# Patient Record
Sex: MALE | Race: WHITE | NOT HISPANIC OR LATINO | URBAN - METROPOLITAN AREA
[De-identification: names, ages, dates, MRNs, and addresses within clinical notes are randomized per-mention and may not be internally consistent; named-entity substitution may affect disease eponyms.]

---

## 2019-01-15 ENCOUNTER — EMERGENCY (EMERGENCY)
Facility: HOSPITAL | Age: 44
LOS: 1 days | Discharge: HOME | End: 2019-01-15
Admitting: EMERGENCY MEDICINE
Payer: COMMERCIAL

## 2019-01-15 VITALS
OXYGEN SATURATION: 98 % | SYSTOLIC BLOOD PRESSURE: 128 MMHG | RESPIRATION RATE: 16 BRPM | TEMPERATURE: 97 F | HEART RATE: 74 BPM | DIASTOLIC BLOOD PRESSURE: 87 MMHG | WEIGHT: 214.95 LBS

## 2019-01-15 DIAGNOSIS — R21 RASH AND OTHER NONSPECIFIC SKIN ERUPTION: ICD-10-CM

## 2019-01-15 DIAGNOSIS — B02.9 ZOSTER WITHOUT COMPLICATIONS: ICD-10-CM

## 2019-01-15 PROCEDURE — 99283 EMERGENCY DEPT VISIT LOW MDM: CPT

## 2019-01-15 PROCEDURE — 99284 EMERGENCY DEPT VISIT MOD MDM: CPT

## 2019-01-15 RX ORDER — IBUPROFEN 200 MG
1 TABLET ORAL
Qty: 21 | Refills: 0 | OUTPATIENT
Start: 2019-01-15 | End: 2019-01-21

## 2019-01-15 RX ORDER — VALACYCLOVIR 500 MG/1
1 TABLET, FILM COATED ORAL
Qty: 7 | Refills: 0 | OUTPATIENT
Start: 2019-01-15 | End: 2019-01-21

## 2019-01-15 RX ORDER — VALACYCLOVIR 500 MG/1
1000 TABLET, FILM COATED ORAL ONCE
Qty: 0 | Refills: 0 | Status: COMPLETED | OUTPATIENT
Start: 2019-01-15 | End: 2019-01-15

## 2019-01-15 RX ORDER — IBUPROFEN 200 MG
600 TABLET ORAL ONCE
Qty: 0 | Refills: 0 | Status: COMPLETED | OUTPATIENT
Start: 2019-01-15 | End: 2019-01-15

## 2019-01-15 RX ORDER — OXYCODONE AND ACETAMINOPHEN 5; 325 MG/1; MG/1
1 TABLET ORAL ONCE
Qty: 0 | Refills: 0 | Status: DISCONTINUED | OUTPATIENT
Start: 2019-01-15 | End: 2019-01-15

## 2019-01-15 RX ADMIN — Medication 600 MILLIGRAM(S): at 17:21

## 2019-01-15 RX ADMIN — OXYCODONE AND ACETAMINOPHEN 1 TABLET(S): 5; 325 TABLET ORAL at 17:21

## 2019-01-15 RX ADMIN — VALACYCLOVIR 1000 MILLIGRAM(S): 500 TABLET, FILM COATED ORAL at 17:21

## 2019-01-15 NOTE — ED ADULT NURSE NOTE - CHPI ED NUR SYMPTOMS NEG
no fever/no chills/no confusion/no itching/no body aches/no scaly patches on skin/no vomiting/no decreased eating/drinking

## 2019-01-15 NOTE — ED PROVIDER NOTE - OBJECTIVE STATEMENT
43 y o male denying PMHX presents to ED for rash in the anterior and posterior right trunk for x3-4 days. Notes painful vessicles. Denies fevers, chills, N/V, abdominal pain, SOB, chest pain, or other sx.

## 2019-01-15 NOTE — ED PROVIDER NOTE - MEDICAL DECISION MAKING DETAILS
Pt presents with painful rash for 3-4 days. Grouped dry vesicles on Right sided trunk. Suspected shingles. Will dc with prescribed abx. No other complaints at this time. VS normal.

## 2019-01-15 NOTE — ED ADULT NURSE NOTE - NSIMPLEMENTINTERV_GEN_ALL_ED
Implemented All Universal Safety Interventions:  [place] to call system. Call bell, personal items and telephone within reach. Instruct patient to call for assistance. Room bathroom lighting operational. Non-slip footwear when patient is off stretcher. Physically safe environment: no spills, clutter or unnecessary equipment. Stretcher in lowest position, wheels locked, appropriate side rails in place.

## 2019-01-15 NOTE — ED ADULT TRIAGE NOTE - CHIEF COMPLAINT QUOTE
Pt presents to ED with c/o rash with sharp pain to right scapula, denies itching/drainage or recent travel.

## 2019-03-01 ENCOUNTER — APPOINTMENT (EMERGENCY)
Dept: RADIOLOGY | Facility: HOSPITAL | Age: 44
End: 2019-03-01
Payer: MEDICAID

## 2019-03-01 ENCOUNTER — HOSPITAL ENCOUNTER (EMERGENCY)
Facility: HOSPITAL | Age: 44
Discharge: HOME | End: 2019-03-01
Attending: EMERGENCY MEDICINE
Payer: MEDICAID

## 2019-03-01 VITALS
SYSTOLIC BLOOD PRESSURE: 153 MMHG | HEART RATE: 82 BPM | WEIGHT: 215 LBS | BODY MASS INDEX: 29.12 KG/M2 | DIASTOLIC BLOOD PRESSURE: 93 MMHG | TEMPERATURE: 98 F | OXYGEN SATURATION: 96 % | RESPIRATION RATE: 20 BRPM | HEIGHT: 72 IN

## 2019-03-01 DIAGNOSIS — J06.9 INFECTION OF THE UPPER RESPIRATORY TRACT: Primary | ICD-10-CM

## 2019-03-01 PROCEDURE — 71046 X-RAY EXAM CHEST 2 VIEWS: CPT

## 2019-03-01 PROCEDURE — 99283 EMERGENCY DEPT VISIT LOW MDM: CPT

## 2019-03-01 RX ORDER — AZITHROMYCIN 250 MG/1
500 TABLET, FILM COATED ORAL ONCE
Status: COMPLETED | OUTPATIENT
Start: 2019-03-01 | End: 2019-03-01

## 2019-03-01 RX ORDER — AZITHROMYCIN 250 MG/1
TABLET, FILM COATED ORAL
Qty: 6 TABLET | Refills: 0 | Status: SHIPPED | OUTPATIENT
Start: 2019-03-01 | End: 2019-03-05

## 2019-03-01 RX ADMIN — AZITHROMYCIN 500 MG: 250 TABLET, FILM COATED ORAL at 23:27

## 2019-03-02 NOTE — ED PROVIDER NOTES
History  Chief Complaint   Patient presents with    Cough     patient is c/o a cough x 2 days  denies fever  Patient is a 44-year-old male presents emergency department with complaints of cough and congestion for last 2 days  Patient denies fever, chills, chest pain, shortness of breath  Patient denies nausea or vomiting  None       History reviewed  No pertinent past medical history  History reviewed  No pertinent surgical history  History reviewed  No pertinent family history  I have reviewed and agree with the history as documented  Social History     Tobacco Use    Smoking status: Never Smoker    Smokeless tobacco: Never Used   Substance Use Topics    Alcohol use: Not Currently    Drug use: Not Currently        Review of Systems   Constitutional: Negative for fever  Respiratory: Positive for cough  Negative for shortness of breath  Cardiovascular: Negative for chest pain  All other systems reviewed and are negative  Physical Exam  Physical Exam   Constitutional: He is oriented to person, place, and time  He appears well-developed and well-nourished  HENT:   Head: Normocephalic and atraumatic  Right Ear: External ear normal    Left Ear: External ear normal    Nose: Nose normal    Mouth/Throat: Oropharynx is clear and moist    Eyes: Pupils are equal, round, and reactive to light  Conjunctivae and EOM are normal    Neck: Normal range of motion  Cardiovascular: Normal rate and regular rhythm  Pulmonary/Chest: Effort normal and breath sounds normal    Abdominal: Soft  Bowel sounds are normal    Musculoskeletal: Normal range of motion  Neurological: He is alert and oriented to person, place, and time  Skin: Skin is warm  Psychiatric: He has a normal mood and affect  His behavior is normal  Judgment and thought content normal    Vitals reviewed        Vital Signs  ED Triage Vitals   Temperature Pulse Respirations Blood Pressure SpO2   03/01/19 2236 03/01/19 2236 03/01/19 2236 03/01/19 2236 03/01/19 2236   97 8 °F (36 6 °C) 82 20 153/93 96 %      Temp Source Heart Rate Source Patient Position - Orthostatic VS BP Location FiO2 (%)   03/01/19 2236 03/01/19 2236 03/01/19 2236 03/01/19 2236 --   Oral Monitor Sitting Left arm       Pain Score       03/01/19 2248       No Pain           Vitals:    03/01/19 2236   BP: 153/93   Pulse: 82   Patient Position - Orthostatic VS: Sitting       Visual Acuity      ED Medications  Medications   azithromycin (ZITHROMAX) tablet 500 mg (500 mg Oral Given 3/1/19 2327)       Diagnostic Studies  Results Reviewed     None                 XR chest pa & lateral   ED Interpretation by Nae Jasso PA-C (03/01 2325)   No acute disease                 Procedures  Procedures       Phone Contacts  ED Phone Contact    ED Course                               MDM  Number of Diagnoses or Management Options  Infection of the upper respiratory tract:   Diagnosis management comments: Patient is a 51-year-old male presents emergency department complaints of cough and congestion  X-ray images reviewed does not demonstrate any evidence of pneumonia  Patient started on azithromycin  His continue this for and 5 days  Follow up with his family doctor  Patient stable for discharge  Return parameters were discussed         Amount and/or Complexity of Data Reviewed  Tests in the radiology section of CPT®: ordered and reviewed  Independent visualization of images, tracings, or specimens: yes    Risk of Complications, Morbidity, and/or Mortality  Presenting problems: moderate  Diagnostic procedures: moderate  Management options: moderate    Patient Progress  Patient progress: stable      Disposition  Final diagnoses:   Infection of the upper respiratory tract     Time reflects when diagnosis was documented in both MDM as applicable and the Disposition within this note     Time User Action Codes Description Comment    3/1/2019 11:23 PM Berenice Pappas [J06 9] Infection of the upper respiratory tract       ED Disposition     ED Disposition Condition Date/Time Comment    Discharge Good Fri Mar 1, 2019 11:23  [facility] discharge to home/self care  Follow-up Information    None         Discharge Medication List as of 3/1/2019 11:24 PM      START taking these medications    Details   azithromycin (ZITHROMAX) 250 mg tablet Take 2 tablets today then 1 tablet daily x 4 days, Print           No discharge procedures on file      ED Provider  Electronically Signed by           Que Powers PA-C  03/02/19 6107

## 2021-01-19 ENCOUNTER — EMERGENCY (EMERGENCY)
Facility: HOSPITAL | Age: 46
LOS: 1 days | Discharge: HOME | End: 2021-01-19
Attending: EMERGENCY MEDICINE | Admitting: EMERGENCY MEDICINE
Payer: COMMERCIAL

## 2021-01-19 VITALS
SYSTOLIC BLOOD PRESSURE: 131 MMHG | DIASTOLIC BLOOD PRESSURE: 85 MMHG | HEART RATE: 80 BPM | TEMPERATURE: 98 F | OXYGEN SATURATION: 99 % | RESPIRATION RATE: 18 BRPM

## 2021-01-19 DIAGNOSIS — Z20.822 CONTACT WITH AND (SUSPECTED) EXPOSURE TO COVID-19: ICD-10-CM

## 2021-01-19 PROCEDURE — 99283 EMERGENCY DEPT VISIT LOW MDM: CPT

## 2021-01-19 NOTE — ED PROVIDER NOTE - NSFOLLOWUPINSTRUCTIONS_ED_ALL_ED_FT
Your test results may take 1-3 days. You will get a text/email.  Please check the patient online portal (Shaz[employer] and website) for results. You can create a portal account at [email or web address]. [facility] [place]. If you have old records with [employer] or [facility]  or encounter any difficulties with us you will need to call the HELP line to merge results [phone] (Mon-Fri 8a-5p).    Please follow the instructions on provided coronavirus discharge educational forms and if needed self quarantine for 14 days.     If you test positive for COVID 19:    1. STAY HOME for 14 DAYS  2. Minimize human contact to ONLY ESSENTIAL  3. Every time you wash your hands, sing the HAPPY BIRTHDAY song so you know you're washing long enough.  Make sure to scrub the webspace between your fingers.  4. DRINK 1-3 Liters of fluids day x at least 5 days.  To remain hydrated. Your fatigue, lightheadedness, and body aches will decrease and your fever has a better chance of breaking if you are well hydrated.    5. For your Fever and Body aches takes Tylenol 650-100mg every 4-6h (max 4000mg/day). Try not to use ibuprofen, aspirin or naproxen (Advil, Motrin or Aleve) as these may worsen Coronavirus infection.  6. Use an inhaler for mild shortness of breath and cough  7. RETURN TO THE ER IMMEDIATELY IF YOU HAVE WORSENING SHORTNESS OF BREATH  8. TAKE THE FOLLOWING SUPPLEMENTS DAILY.        VITAMIN C 1000MG ONCE DAILY.        VITAMIN D 200IU ONCE DAILY.        ZINC 50MG ONCE DAILY.

## 2021-01-19 NOTE — ED PROVIDER NOTE - PHYSICAL EXAMINATION
VITAL SIGNS: I have reviewed the vital signs and the triage nursing notes  CONSTITUTIONAL:  in no acute distress. appears comfortable   HEAD: airway patient  CARD: RRR S1, S2 normal; no murmurs, gallops, or rubs.   RESP: No wheezes, rales or rhonchi.  ABD: Normal bowel sounds; soft; non-distended; non-tender  MSK: Normal ROM  NEURO: Alert, oriented speech fluent and appropriate. gait normal  SKIN: Skin is warm and dry, no acute rash.

## 2021-01-19 NOTE — ED PROVIDER NOTE - NS ED ROS FT
· CONSTITUTIONAL: no fever and no chills.  · CARDIOVASCULAR: normal rate and rhythm, no chest pain and no edema.  · RESPIRATORY: no cough, and no shortness of breath. no pain on inspiration   · GASTROINTESTINAL: no abdominal pain, no diarrhea, no nausea and no vomiting.  · MUSCULOSKELETAL: no back pain, no musculoskeletal pain, no neck pain, and no weakness.  · SKIN: no abrasions, no jaundice, no lesions,  no rashes.  · NEURO: no loss of consciousness, no gait abnormality, no headache, no sensory deficits, and no weakness.

## 2021-01-19 NOTE — ED PROVIDER NOTE - CLINICAL SUMMARY MEDICAL DECISION MAKING FREE TEXT BOX
Asymptomatic patient here for COVID screening. Risk of exposure is very low. Reviewed vitals and testing plan with the patient. Encouraged to download the follow my health rafa for test results.

## 2021-01-19 NOTE — ED PROVIDER NOTE - PATIENT PORTAL LINK FT
You can access the FollowMyHealth Patient Portal offered by [facility] by registering at the following website: [email or web address]. By joining [employer]’s FollowMyHealth portal, you will also be able to view your health information using other applications (apps) compatible with our system.

## 2021-02-02 ENCOUNTER — EMERGENCY (EMERGENCY)
Facility: HOSPITAL | Age: 46
LOS: 1 days | Discharge: HOME | End: 2021-02-02
Admitting: EMERGENCY MEDICINE
Payer: COMMERCIAL

## 2021-02-02 VITALS
OXYGEN SATURATION: 99 % | SYSTOLIC BLOOD PRESSURE: 126 MMHG | HEART RATE: 60 BPM | TEMPERATURE: 97 F | DIASTOLIC BLOOD PRESSURE: 81 MMHG | RESPIRATION RATE: 17 BRPM

## 2021-02-02 DIAGNOSIS — Z79.899 OTHER LONG TERM (CURRENT) DRUG THERAPY: ICD-10-CM

## 2021-02-02 DIAGNOSIS — Z20.822 CONTACT WITH AND (SUSPECTED) EXPOSURE TO COVID-19: ICD-10-CM

## 2021-02-02 DIAGNOSIS — Z79.891 LONG TERM (CURRENT) USE OF OPIATE ANALGESIC: ICD-10-CM

## 2021-02-02 DIAGNOSIS — Z79.1 LONG TERM (CURRENT) USE OF NON-STEROIDAL ANTI-INFLAMMATORIES (NSAID): ICD-10-CM

## 2021-02-02 PROCEDURE — 99283 EMERGENCY DEPT VISIT LOW MDM: CPT

## 2021-02-02 NOTE — ED PROVIDER NOTE - PATIENT PORTAL LINK FT
You can access the FollowMyHealth Patient Portal offered by [facility] by registering at the following website: [email or web address]. By joining [employer]’s FollowMyHealth portal, you will also be able to view your health information using other applications (apps) compatible with our system.

## 2021-02-02 NOTE — ED PROVIDER NOTE - NSFOLLOWUPINSTRUCTIONS_ED_ALL_ED_FT
Your test results may take 1-3 days. You will get a text/email.  Please check the patient online portal (Shazia and website) for results. You can create a portal account at [email or web address] [facility] [place]. If you have old records with [employer] or [facility]  or encounter any difficulties with us you will need to call the HELP line to merge results [phone] (Mon-Fri 8a-5p).    Please follow the instructions on provided coronavirus discharge educational forms and if needed self quarantine for 14 days.     If you test positive for COVID 19:    1. STAY HOME for 14 DAYS  2. Minimize human contact to ONLY ESSENTIAL  3. Every time you wash your hands, sing the HAPPY BIRTHDAY song so you know you're washing long enough.  Make sure to scrub the webspace between your fingers.  4. DRINK 1-3 Liters of fluids day x at least 5 days.  To remain hydrated. Your fatigue, lightheadedness, and body aches will decrease and your fever has a better chance of breaking if you are well hydrated.    5. For your Fever and Body aches takes Tylenol 650-100mg every 4-6h (max 4000mg/day). Try not to use ibuprofen, aspirin or naproxen (Advil, Motrin or Aleve) as these may worsen Coronavirus infection.  6. RETURN TO THE ER IMMEDIATELY IF YOU HAVE WORSENING SHORTNESS OF BREATH  7. TAKE THE FOLLOWING SUPPLEMENTS DAILY.        VITAMIN C 1000MG ONCE DAILY.        VITAMIN D 200IU ONCE DAILY.        ZINC 50MG ONCE DAILY.

## 2021-02-02 NOTE — ED PROVIDER NOTE - OBJECTIVE STATEMENT
Patient presented requesting covid-19 testing. Patient asymptomatic - denies chest pain, dyspnea, fever, cough. denies recent travel or known exposure to covid-19 Patient presented requesting covid-19 testing. Patient asymptomatic - denies chest pain, dyspnea, fever. denies recent travel or known exposure to covid-19. tested pos for covid-19 2 weeks ago, states job is asking for repeat swab prior to return to work. c/o mild persistent cough.

## 2021-02-03 LAB — SARS-COV-2 RNA SPEC QL NAA+PROBE: DETECTED

## 2022-07-26 ENCOUNTER — EMERGENCY (EMERGENCY)
Facility: HOSPITAL | Age: 47
LOS: 1 days | Discharge: HOME | End: 2022-07-26
Admitting: EMERGENCY MEDICINE

## 2022-07-26 VITALS
HEART RATE: 78 BPM | WEIGHT: 235.01 LBS | OXYGEN SATURATION: 98 % | DIASTOLIC BLOOD PRESSURE: 84 MMHG | RESPIRATION RATE: 16 BRPM | TEMPERATURE: 98 F | SYSTOLIC BLOOD PRESSURE: 135 MMHG

## 2022-07-26 PROCEDURE — 73070 X-RAY EXAM OF ELBOW: CPT | Mod: 26,LT

## 2022-07-26 PROCEDURE — 99283 EMERGENCY DEPT VISIT LOW MDM: CPT | Mod: 25

## 2022-07-26 PROCEDURE — 73080 X-RAY EXAM OF ELBOW: CPT | Mod: 26,RT

## 2022-07-26 RX ADMIN — Medication 500 MILLIGRAM(S): at 18:37

## 2022-07-26 NOTE — ED PROVIDER NOTE - CARE PROVIDER_API CALL
Fransisco Wasserman)  Orthopaedic Surgery  [address] [place] [address]  Phone: [phone]  Fax: [phone]  Follow Up Time: Urgent

## 2022-07-26 NOTE — ED PROVIDER NOTE - PATIENT PORTAL LINK FT
You can access the FollowMyHealth Patient Portal offered by [facility] by registering at the following website: [email or web address]. By joining [employer]’s FollowMyHealth portal, you will also be able to view your health information using other applications (apps) compatible with our system.

## 2022-07-26 NOTE — ED PROVIDER NOTE - OBJECTIVE STATEMENT
46 yo M, R hand dominant, presenting with R elbow pain s/p lifting a heavy cabinet 1 month ago. +pain w/ movement. Non radiating. Denies numbness, tingling, weakness, chest pain, or SOB.

## 2022-07-26 NOTE — ED PROVIDER NOTE - MUSCULOSKELETAL, MLM
+ttp along the R elbow, no swelling or bursitis, FROM, no skin erythema or bruising; FROM of the upper and lower joints [to the elbow]

## 2022-07-26 NOTE — ED PROVIDER NOTE - CLINICAL SUMMARY MEDICAL DECISION MAKING FREE TEXT BOX
46 yo M, R hand dominant, presenting with R elbow pain s/p lifting a heavy cabinet 1 month ago. Will obtain elbow XR. PO naprosyn for pain. Ortho f/u.
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
## 2022-07-27 DIAGNOSIS — S53.401A UNSPECIFIED SPRAIN OF RIGHT ELBOW, INITIAL ENCOUNTER: ICD-10-CM

## 2022-07-27 DIAGNOSIS — M25.521 PAIN IN RIGHT ELBOW: ICD-10-CM
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
## 2022-08-15 PROBLEM — Z00.00 ENCOUNTER FOR PREVENTIVE HEALTH EXAMINATION: Status: ACTIVE | Noted: 2022-08-15

## 2022-08-16 ENCOUNTER — APPOINTMENT (OUTPATIENT)
Dept: ORTHOPEDIC SURGERY | Facility: CLINIC | Age: 47
End: 2022-08-16

## 2022-09-21 ENCOUNTER — EMERGENCY (EMERGENCY)
Facility: HOSPITAL | Age: 47
LOS: 1 days | Discharge: HOME | End: 2022-09-21
Attending: EMERGENCY MEDICINE | Admitting: EMERGENCY MEDICINE

## 2022-09-21 VITALS
OXYGEN SATURATION: 96 % | SYSTOLIC BLOOD PRESSURE: 143 MMHG | DIASTOLIC BLOOD PRESSURE: 93 MMHG | HEART RATE: 73 BPM | RESPIRATION RATE: 16 BRPM | TEMPERATURE: 97 F | WEIGHT: 220.02 LBS

## 2022-09-21 PROCEDURE — 99284 EMERGENCY DEPT VISIT MOD MDM: CPT

## 2022-09-21 RX ORDER — KETOROLAC TROMETHAMINE 30 MG/ML
30 SYRINGE (ML) INJECTION ONCE
Refills: 0 | Status: DISCONTINUED | OUTPATIENT
Start: 2022-09-21 | End: 2022-09-21

## 2022-09-21 RX ORDER — OXYCODONE AND ACETAMINOPHEN 5; 325 MG/1; MG/1
1 TABLET ORAL ONCE
Refills: 0 | Status: DISCONTINUED | OUTPATIENT
Start: 2022-09-21 | End: 2022-09-21

## 2022-09-21 RX ORDER — LIDOCAINE 4 G/100G
1 CREAM TOPICAL ONCE
Refills: 0 | Status: COMPLETED | OUTPATIENT
Start: 2022-09-21 | End: 2022-09-21

## 2022-09-21 RX ADMIN — LIDOCAINE 1 PATCH: 4 CREAM TOPICAL at 02:43

## 2022-09-21 RX ADMIN — OXYCODONE AND ACETAMINOPHEN 1 TABLET(S): 5; 325 TABLET ORAL at 02:34

## 2022-09-21 RX ADMIN — Medication 30 MILLIGRAM(S): at 02:34

## 2022-09-21 NOTE — ED ADULT TRIAGE NOTE - CHIEF COMPLAINT QUOTE
walk in to ED c/o lower back pain radiating down the left leg for the past few days, states he had a disc problem a few years ago. denies bladder/bowel dysfunction or numbness/tingling. ambulatory into ED. having difficulty sitting down

## 2022-09-21 NOTE — ED PROVIDER NOTE - PHYSICAL EXAMINATION
Constitutional: awake and alert, in no acute distress  HEENT: head normocephalic and atraumatic. moist mucous membranes  Eyes: extraocular movements intact, normal conjunctiva  Neck: supple, normal ROM  Cardiovascular: regular rate   Pulmonary: no respiratory distress, symmetric expansion  Gastrointestinal: abdomen flat and nondistended  Skin: warm, dry, normal for ethnicity  Musculoskeletal: no midline spinal TTP or step offs. muscular TTP in L paravertebral muscles in lumbar spine.  no peripheral edema, no gross deformity of extremities.  +straight leg raise.  Neurological: lower ext strength: hip flexion and extension 5/5 b/l, knee flexion and extension 5/5 b/l, ankle plantar flexion and dorsiflexion 5/5 b/l.  oriented x4, no focal neurologic deficit.   Psychiatric: calm and cooperative Constitutional: awake and alert, uncomfortable appearing  HEENT: head normocephalic and atraumatic. moist mucous membranes  Eyes: extraocular movements intact, normal conjunctiva  Neck: supple, normal ROM  Cardiovascular: regular rate   Pulmonary: no respiratory distress, symmetric expansion  Gastrointestinal: abdomen flat and nondistended  Skin: warm, dry, normal for ethnicity  Musculoskeletal: no midline spinal TTP or step offs. muscular TTP in L paravertebral muscles in lumbar spine.  no peripheral edema, no gross deformity of extremities.  +straight leg raise.  Neurological: lower ext strength: hip flexion and extension 5/5 b/l, knee flexion and extension 5/5 b/l, ankle plantar flexion and dorsiflexion 5/5 b/l.  oriented x4, no focal neurologic deficit.   Psychiatric: calm and cooperative

## 2022-09-21 NOTE — ED PROVIDER NOTE - OBJECTIVE STATEMENT
46yo M hx of lumbar disc herniation presents with worsening pain in L low back radiating down back of L leg to knee x3 days.  States pain is keeping him from sleeping, and he came to ED to request stronger pain med that will help him sleep.  Taking tylenol, ibuprofen, and naproxen at home.  No trauma to back.  No heavy lifting.  No leg weakness.  No leg weakness or numbness.  No incontinence of stool or urine.  No difficulty urinating.  Has appt w his spine MD tomorrow morning.  Last MRI was 1yr ago, after which pt did PT for some time which was temporarily helpful.

## 2022-09-21 NOTE — ED PROVIDER NOTE - PATIENT PORTAL LINK FT
You can access the FollowMyHealth Patient Portal offered by [facility] by registering at the following website: [email or web address]. By joining [employer]’s FollowMyHealth portal, you will also be able to view your health information using other applications (apps) compatible with our system.

## 2022-09-21 NOTE — ED PROVIDER NOTE - CLINICAL SUMMARY MEDICAL DECISION MAKING FREE TEXT BOX
Pt here with atraumatic low back pain, worse with changing positions.  No bowel or bladder dysfunction.  On exam, afebrile, VSS, well appearing.  No midline spinal TTP.  Strength in BLE 5/5.  Pt ambulatory in ED.  No symptoms or signs on exam to suggest cord compression at this time.  More likely MSK strain.  Plan for heat packs, analgesia, reassess. Pt w hx of lumbar disc herniation and low back pain, here with atraumatic worsening L low back pain radiating down back of L leg, worse with changing positions.  No bowel or bladder dysfunction.  On exam, afebrile, VSS, well appearing.  No midline spinal TTP.  Strength in BLE 5/5.  Pt ambulatory in ED.  No symptoms or signs on exam to suggest cord compression at this time.  More likely MSK strain/ sciatica/ exacerbation of chronic pain.  Plan for heat packs, analgesia, reassess.    Pt improved after meds.  Will dc w plan for pt's own MD follow up tomorrow morning as scheduled.

## 2022-09-23 DIAGNOSIS — Z87.39 PERSONAL HISTORY OF OTHER DISEASES OF THE MUSCULOSKELETAL SYSTEM AND CONNECTIVE TISSUE: ICD-10-CM

## 2022-09-23 DIAGNOSIS — M54.50 LOW BACK PAIN, UNSPECIFIED: ICD-10-CM

## 2024-03-20 ENCOUNTER — EMERGENCY (EMERGENCY)
Facility: HOSPITAL | Age: 49
LOS: 1 days | Discharge: HOME | End: 2024-03-20
Admitting: EMERGENCY MEDICINE
Payer: COMMERCIAL

## 2024-03-20 VITALS
DIASTOLIC BLOOD PRESSURE: 85 MMHG | SYSTOLIC BLOOD PRESSURE: 125 MMHG | HEART RATE: 79 BPM | OXYGEN SATURATION: 95 % | TEMPERATURE: 98 F | WEIGHT: 229.94 LBS | RESPIRATION RATE: 18 BRPM

## 2024-03-20 DIAGNOSIS — R20.0 ANESTHESIA OF SKIN: ICD-10-CM

## 2024-03-20 DIAGNOSIS — M54.42 LUMBAGO WITH SCIATICA, LEFT SIDE: ICD-10-CM

## 2024-03-20 PROBLEM — M51.26 OTHER INTERVERTEBRAL DISC DISPLACEMENT, LUMBAR REGION: Chronic | Status: ACTIVE | Noted: 2022-09-21

## 2024-03-20 PROCEDURE — 99284 EMERGENCY DEPT VISIT MOD MDM: CPT

## 2024-03-20 RX ORDER — KETOROLAC TROMETHAMINE 30 MG/ML
30 SYRINGE (ML) INJECTION ONCE
Refills: 0 | Status: DISCONTINUED | OUTPATIENT
Start: 2024-03-20 | End: 2024-03-20

## 2024-03-20 RX ORDER — LIDOCAINE 4 G/100G
1 CREAM TOPICAL ONCE
Refills: 0 | Status: COMPLETED | OUTPATIENT
Start: 2024-03-20 | End: 2024-03-20

## 2024-03-20 RX ORDER — DEXAMETHASONE 0.5 MG/5ML
8 ELIXIR ORAL ONCE
Refills: 0 | Status: COMPLETED | OUTPATIENT
Start: 2024-03-20 | End: 2024-03-20

## 2024-03-20 RX ADMIN — Medication 30 MILLIGRAM(S): at 16:17

## 2024-03-20 RX ADMIN — Medication 8 MILLIGRAM(S): at 16:16

## 2024-03-20 RX ADMIN — LIDOCAINE 1 PATCH: 4 CREAM TOPICAL at 16:17

## 2024-03-20 NOTE — ED ADULT NURSE NOTE - NSFALLUNIVINTERV_ED_ALL_ED
Bed/Stretcher in lowest position, wheels locked, appropriate side rails in place/Call bell, personal items and telephone in reach/Instruct patient to call for assistance before getting out of bed/chair/stretcher/Non-slip footwear applied when patient is off stretcher/[place] to call system/Physically safe environment - no spills, clutter or unnecessary equipment/Purposeful proactive rounding/Room/bathroom lighting operational, light cord in reach

## 2024-03-20 NOTE — ED PROVIDER NOTE - IV ALTEPLASE INCLUSION HIDDEN
Multiple episodes of vomiting starting this evening, last episode in triage.  Not tolerating PO intake. No c/o diarrhea. Zofran given.   show wheelchair

## 2024-03-20 NOTE — ED PROVIDER NOTE - NSFOLLOWUPINSTRUCTIONS_ED_ALL_ED_FT
CONTINUE HOME MEDS.     STEROIDS WILL KICK IN AND STAY IN FOR 2-3 DAYS.     FOLLOW UP WITH YOUR SPINE SURGEON AS SCHEDULED FOR TUESDAY.     Back Pain    Back pain is very common in adults. The cause of back pain is rarely dangerous and the pain often gets better over time. The cause of your back pain may not be known and may include strain of muscles or ligaments, degeneration of the spinal disks, or arthritis. Occasionally the pain may radiate down your leg(s). Over-the-counter medicines to reduce pain and inflammation are often the most helpful. Stretching and remaining active frequently helps the healing process.     SEEK IMMEDIATE MEDICAL CARE IF YOU HAVE ANY OF THE FOLLOWING SYMPTOMS: bowel or bladder control problems, unusual weakness or numbness in your arms or legs, nausea or vomiting, abdominal pain, fever, dizziness/lightheadedness.        3. Over the counter SALONPAS SPRAY or LIDOCAINE patches  4. Get a massage (or two)  5. Try hot compresses, showers and baths  6. In 1-2 weeks, start back pain stretches from [employer]  7. In 1-2 months, start back pain strengthening exercises from [employer] Or see your regular doctor to get a referral for PHYSICAL THERAPY  8. If pain persists for 6 weeks, see your doctor for x-rays  9. IF  you get fevers or neurologic deficits - decreased sensation, weakness, tingling in your arms or legs OR  trouble urinating, weight loss or night sweats, then RETURN to the ER or see your doctor ASAP

## 2024-03-20 NOTE — ED PROVIDER NOTE - OBJECTIVE STATEMENT
48yo M with h/o sciatica presents with c/o back pain. has known sciatica and took gabapentin, ibuprofen, and robaxin over the last 10 days. last ibuprofen around 9am. states toradol usually helps him. has chronic numbness in part of left foot which is unchanged. no trauma or injuries. denies incontinence or any new symptoms other than pain. not a surgical candidate.

## 2024-03-20 NOTE — ED PROVIDER NOTE - NEUROLOGICAL, MLM
Alert and oriented, no focal deficits, no motor deficits. + left foot with some chronic decreased sensation, 2+ dp/pt pulses equal bilat.

## 2024-03-20 NOTE — ED PROVIDER NOTE - PATIENT PORTAL LINK FT
You can access the FollowMyHealth Patient Portal offered by [facility] by registering at the following website: [email or web address]. By joining [employer]’s FollowMyHealth portal, you will also be able to view your health information using other applications (apps) compatible with our system.

## 2024-03-20 NOTE — ED PROVIDER NOTE - CLINICAL SUMMARY MEDICAL DECISION MAKING FREE TEXT BOX
pt presents with sciatica. took ibruprofen, robaxin, and gabapentin without relief. requesting toradol. last ibuprofen was this morning. given toradol, decadron, and lidocaine patch with relief. will d/c.

## 2024-03-26 NOTE — ED ADULT TRIAGE NOTE - HEART RATE (BEATS/MIN)
Prior Authorization Approval    Medication: ZYTIGA 250 MG PO TABS  Authorization Effective Date: 3/22/2024  Authorization Expiration Date: 4/1/2024  Approved Dose/Quantity: 60/30  Reference #:     Insurance Company: [place] Medicaid ([facility]) - Phone [phone] Fax [phone]  Expected CoPay: $ 0  CoPay Card Available: No     Financial Assistance Needed: No  Which Pharmacy is filling the prescription: [facility] - [place], [place] - [address]  Pharmacy Notified: Yes  Patient Notified: Yes        74

## 2024-04-01 ENCOUNTER — EMERGENCY (EMERGENCY)
Facility: HOSPITAL | Age: 49
LOS: 1 days | Discharge: HOME | End: 2024-04-01
Admitting: EMERGENCY MEDICINE
Payer: COMMERCIAL

## 2024-04-01 VITALS
HEART RATE: 74 BPM | RESPIRATION RATE: 18 BRPM | HEIGHT: 72 IN | SYSTOLIC BLOOD PRESSURE: 145 MMHG | DIASTOLIC BLOOD PRESSURE: 88 MMHG | TEMPERATURE: 98 F | WEIGHT: 220.02 LBS | OXYGEN SATURATION: 97 %

## 2024-04-01 VITALS
DIASTOLIC BLOOD PRESSURE: 83 MMHG | SYSTOLIC BLOOD PRESSURE: 138 MMHG | TEMPERATURE: 98 F | OXYGEN SATURATION: 97 % | RESPIRATION RATE: 18 BRPM | HEART RATE: 72 BPM

## 2024-04-01 PROCEDURE — 99284 EMERGENCY DEPT VISIT MOD MDM: CPT

## 2024-04-01 RX ORDER — CYCLOBENZAPRINE HYDROCHLORIDE 10 MG/1
10 TABLET, FILM COATED ORAL ONCE
Refills: 0 | Status: COMPLETED | OUTPATIENT
Start: 2024-04-01 | End: 2024-04-01

## 2024-04-01 RX ORDER — LIDOCAINE 4 G/100G
1 CREAM TOPICAL ONCE
Refills: 0 | Status: COMPLETED | OUTPATIENT
Start: 2024-04-01 | End: 2024-04-01

## 2024-04-01 RX ORDER — KETOROLAC TROMETHAMINE 30 MG/ML
30 SYRINGE (ML) INJECTION ONCE
Refills: 0 | Status: DISCONTINUED | OUTPATIENT
Start: 2024-04-01 | End: 2024-04-01

## 2024-04-01 RX ORDER — DEXAMETHASONE 0.5 MG/5ML
10 ELIXIR ORAL ONCE
Refills: 0 | Status: COMPLETED | OUTPATIENT
Start: 2024-04-01 | End: 2024-04-01

## 2024-04-01 RX ADMIN — Medication 10 MILLIGRAM(S): at 12:46

## 2024-04-01 RX ADMIN — CYCLOBENZAPRINE HYDROCHLORIDE 10 MILLIGRAM(S): 10 TABLET, FILM COATED ORAL at 12:47

## 2024-04-01 RX ADMIN — Medication 30 MILLIGRAM(S): at 12:47

## 2024-04-01 RX ADMIN — LIDOCAINE 1 PATCH: 4 CREAM TOPICAL at 12:47

## 2024-04-01 NOTE — ED PROVIDER NOTE - CLINICAL SUMMARY MEDICAL DECISION MAKING FREE TEXT BOX
No “red flags” of low back pain noted - epidural compression syndromes unlikely.  No abdominal pain, pulsatile mass, hypotension noted - abdominal aortic aneurysm unlikely.  No fevers, radiation to flanks, CVA tenderness, urinary symptoms - renal disease unlikely  No spinal imaging necessary as no trauma, central spinal tenderness noted  Pain relief medications ordered, will continue to re-asses    I completed a structured, evidence-based clinical evaluation to screen for acute non-traumatic spinal emergencies. The patient has a normal detailed neurologic exam and a low red flag score.  The evidence indicates that the patient is very low risk for an acute spinal emergency and this is consistent with my clinical intuition. The risk of further workup is higher than the likelihood of the patient having a spinal epidural abscess or other dangerous emergency spinal condition. It is, therefore, in the patient’s best interest not to do additional emergent testing at this time.    I have discussed with the patient my clinical impression and the result of an evidence-based clinical evaluation to screen for spinal epidural abscess and other spinal emergencies, as well as the risk of further testing and hospitalization. The evidence shows that the risk for an acute spinal emergency is less than 1%. Although the risk of an acute spinal emergency has not been completely eliminated, the risks of further testing likely exceed any potential benefit, and the patient agrees with not pursuing further emergent evaluation for causes of back pain at this time.

## 2024-04-01 NOTE — ED ADULT NURSE NOTE - OBJECTIVE STATEMENT
Pt presents to ED A&Ox4 with c/o lower back pain. Pt reports pain is chronic and has been ongoing for past 10 years but reports exacerbation this morning. Denies recent trauma or heavy lifting. Denies loss of bowel or bladder control. Denies having taken medications at home for pain. Denies PMH or prescribed medications. No other medical complaints at this time. Pt able to ambulate with steady gait.

## 2024-04-01 NOTE — ED PROVIDER NOTE - OBJECTIVE STATEMENT
49-year-old male, history of lumbar herniated disc, chronic back pain for 10 years, presents this emergency room for low back pain for last 3 days. States that pain is located in b/l low back, constant and throbbing in nature. Denies any trauma to the area.  States that he has been seen in emergency departments before, and Toradol usually works. Denies saddle paresthesia, bowel/bladder incontinence, urinary retention, lower extremity weakness. Denies IVDA, hx of CA, estrogen use, blood thinner use.

## 2024-04-01 NOTE — ED ADULT TRIAGE NOTE - CHIEF COMPLAINT QUOTE
Pt walks in c/o chronic lower back pain (10 years) worsening this AM. Pt denies any recent falls/trauma/lifting. Pt denies any urinary s/s.

## 2024-04-01 NOTE — ED PROVIDER NOTE - PATIENT PORTAL LINK FT
You can access the FollowMyHealth Patient Portal offered by [facility] by registering at the following website: [email or web address]. By joining [facility]’s FollowMyHealth portal, you will also be able to view your health information using other applications (apps) compatible with our system.

## 2024-04-03 DIAGNOSIS — M54.50 LOW BACK PAIN, UNSPECIFIED: ICD-10-CM

## 2024-04-03 DIAGNOSIS — G89.29 OTHER CHRONIC PAIN: ICD-10-CM

## 2024-04-23 NOTE — ED ADULT NURSE NOTE - CHPI ED NUR SYMPTOMS POS
Physical Therapy    Visit Type: treatment and discharge  SUBJECTIVE  Patient agreed to participate in therapy this date.  Patient verbally agrees to allow the following to be present during session: spouse  Reports patient feels okay but their neck and throat hurts.      Patient / Family Goal: maximize function    Pain   RN informed on pain level.    Location: neck, throat    At onset of session (out of 10): 7     OBJECTIVE     Cognitive Status   Functional Communication   - Overall Status: within functional limits      Standing Balance  (XIAO = base of support)  Patient with initial light upper extremity assist on rail with ambulation progressed to no upper extremity assist. Reported they feel a little shaky but denies any adverse symptoms.       Bed Mobility  - Side-lying to sit: independent  - Sit to side-lying: independent  Cervical brace donned throughout.  Transfers  Assistive devices: gait belt (cervical brace.)  - Sit to stand: independent  - Stand to sit: independent    Ambulation / Gait  - Assistive device: gait belt (cervical brace.)  - Distance (feet unless otherwise indicated): 240  - Assist Level: independent and supervision  - Surface: even  Supervision progressed to independent, initial light upper extremity assist on railing but progressed to no upper extremity assist. Reported feeling shaky but no instability demonstrated.    Stair Ambulation  - Number of steps: 2;   - Assist Level: supervision  - Pattern: reciprocal  Cues for set up to simulate home set up.   Interventions     Training provided: bed mobility training, body mechanics, functional ambulation, gait training, positioning, transfer training, safety training, activity tolerance, HEP training and stair training  Instructed in sets/reps for HEP/ when to start.  Skilled input: Verbal instruction/cues and tactile instruction/cues  Verbal Consent: Writer verbally educated and received verbal consent for hand placement, positioning of patient,  and techniques to be performed today from patient for clothing adjustments for techniques, therapist position for techniques and hand placement and palpation for techniques as described above and how they are pertinent to the patient's plan of care.         Education:   - Present and ready to learn: patient  Education provided during session:  - Results of above outlined education: Verbalizes understanding    ASSESSMENT   Progress: goals met    Discharge needs based on today's assessment:   - Current level of function: slightly below baseline level of function   - Therapy needs at discharge: does not require ongoing therapy   - Impairments that require further therapy intervention: pain and strength    AM-PAC  - Generalized Prior Level of Function: IND/MOD I ([facility] 22-24)       Key: MOD A=moderate assistance, IND/MOD I=independent/modified independent  - Generalized Current Level of Function     - Current Mobility Score: 24       AM-PAC Scoring Key= >21 Modified Independent; 20-21 Supervision; 18-19 Minimal assist; 13-17 Moderate assist; 9-12 Max assist; <9 Total assist    Denied any headache/nausea symptoms during session. Reported no mobility/safety concerns.      PLAN (while hospitalized)  Suggestions for next session as indicated:   PT Frequency: DC PT      PT/OT Mobility Equipment for Discharge: none  PT/OT ADL Equipment for Discharge: none  Agreement to plan and goals: patient agrees with goals and treatment plan        GOALS  Long Term Goals: (to be met by time of discharge from hospital)  Sidelying to sit: Patient will complete bed mobility for sidelying to sit modified independent.  Status: met   Sit to sidelying: Patient will complete bed mobility for sit to sidelying modified independent.  Status: met   Sit to stand: Patient will complete sit to stand transfer with independent.   Status: met   Stand to sit: Patient will complete stand to sit transfer with independent.   Status: met   Stand pivot: Patient  will complete stand pivot transfer with independent.   Status: met   Ambulation (even): Patient will ambulate on even surface for 150 feet with independent.   Status: met   Stairs: Patient will ambulate 2 steps with supervision.   Status: met   Home program: patient independent with home program as instructed.   Status: partially met  (verbally demonstrates.)  Documented in the chart in the following areas: Assessment/Plan.      Patient at End of Session:   Location: in bed  Safety measures: alarm system in place/re-engaged and call light within reach  Patient handoff to: RN notified.      Therapy procedure time and total treatment time can be found documented on the Time Entry flowsheet   Rt elbow pain

## 2025-02-01 ENCOUNTER — EMERGENCY (EMERGENCY)
Facility: HOSPITAL | Age: 50
LOS: 1 days | Discharge: HOME | End: 2025-02-01
Attending: EMERGENCY MEDICINE | Admitting: EMERGENCY MEDICINE
Payer: COMMERCIAL

## 2025-02-01 VITALS
RESPIRATION RATE: 20 BRPM | TEMPERATURE: 98 F | SYSTOLIC BLOOD PRESSURE: 135 MMHG | DIASTOLIC BLOOD PRESSURE: 86 MMHG | HEART RATE: 74 BPM | OXYGEN SATURATION: 98 %

## 2025-02-01 VITALS
SYSTOLIC BLOOD PRESSURE: 168 MMHG | HEART RATE: 63 BPM | RESPIRATION RATE: 16 BRPM | OXYGEN SATURATION: 97 % | TEMPERATURE: 98 F | DIASTOLIC BLOOD PRESSURE: 90 MMHG

## 2025-02-01 DIAGNOSIS — F17.210 NICOTINE DEPENDENCE, CIGARETTES, UNCOMPLICATED: ICD-10-CM

## 2025-02-01 DIAGNOSIS — M54.30 SCIATICA, UNSPECIFIED SIDE: ICD-10-CM

## 2025-02-01 DIAGNOSIS — J40 BRONCHITIS, NOT SPECIFIED AS ACUTE OR CHRONIC: ICD-10-CM

## 2025-02-01 DIAGNOSIS — R05.9 COUGH, UNSPECIFIED: ICD-10-CM

## 2025-02-01 LAB
FLUAV AG NPH QL: SIGNIFICANT CHANGE UP
FLUBV AG NPH QL: SIGNIFICANT CHANGE UP
RSV RNA NPH QL NAA+NON-PROBE: SIGNIFICANT CHANGE UP
SARS-COV-2 RNA SPEC QL NAA+PROBE: SIGNIFICANT CHANGE UP

## 2025-02-01 PROCEDURE — 99284 EMERGENCY DEPT VISIT MOD MDM: CPT

## 2025-02-01 PROCEDURE — 71046 X-RAY EXAM CHEST 2 VIEWS: CPT | Mod: 26

## 2025-02-01 RX ORDER — AZITHROMYCIN DIHYDRATE 500 MG/1
1 TABLET, FILM COATED ORAL
Qty: 6 | Refills: 0
Start: 2025-02-01 | End: 2025-02-05

## 2025-02-01 RX ORDER — KETOROLAC TROMETHAMINE 10 MG
30 TABLET ORAL ONCE
Refills: 0 | Status: DISCONTINUED | OUTPATIENT
Start: 2025-02-01 | End: 2025-02-01

## 2025-02-01 RX ORDER — AMOXICILLIN AND CLAVULANATE POTASSIUM 200; 28.5 MG/5ML; MG/5ML
1 POWDER, FOR SUSPENSION ORAL
Qty: 14 | Refills: 0
Start: 2025-02-01 | End: 2025-02-07

## 2025-02-01 RX ORDER — DEXAMETHASONE SODIUM PHOSPHATE 4 MG/ML
10 INJECTION, SOLUTION INTRA-ARTICULAR; INTRALESIONAL; INTRAMUSCULAR; INTRAVENOUS; SOFT TISSUE ONCE
Refills: 0 | Status: COMPLETED | OUTPATIENT
Start: 2025-02-01 | End: 2025-02-01

## 2025-02-01 RX ORDER — IPRATROPIUM BROMIDE AND ALBUTEROL SULFATE .5; 2.5 MG/3ML; MG/3ML
3 SOLUTION RESPIRATORY (INHALATION)
Refills: 0 | Status: DISCONTINUED | OUTPATIENT
Start: 2025-02-01 | End: 2025-02-04

## 2025-02-01 RX ADMIN — Medication 30 MILLIGRAM(S): at 10:59

## 2025-02-01 RX ADMIN — IPRATROPIUM BROMIDE AND ALBUTEROL SULFATE 3 MILLILITER(S): .5; 2.5 SOLUTION RESPIRATORY (INHALATION) at 10:01

## 2025-02-01 RX ADMIN — DEXAMETHASONE SODIUM PHOSPHATE 10 MILLIGRAM(S): 4 INJECTION, SOLUTION INTRA-ARTICULAR; INTRALESIONAL; INTRAMUSCULAR; INTRAVENOUS; SOFT TISSUE at 10:00

## 2025-02-01 RX ADMIN — Medication 30 MILLIGRAM(S): at 09:09

## 2025-02-01 RX ADMIN — IPRATROPIUM BROMIDE AND ALBUTEROL SULFATE 3 MILLILITER(S): .5; 2.5 SOLUTION RESPIRATORY (INHALATION) at 10:50

## 2025-02-01 NOTE — ED PROVIDER NOTE - CLINICAL SUMMARY MEDICAL DECISION MAKING FREE TEXT BOX
50-year-old male history of cigar smoking presents with cough productive of green sputum for 1 month. 50-year-old male history of cigar smoking presents with cough productive of green sputum for 1 month.  On exam, patient is afebrile, vital signs are stable.  Patient well-appearing in no acute distress.  Patient is satting well on room air.  Patient has mild diffuse expiratory wheezing.  Suspect possible bronchitis versus COPD versus viral syndrome.  Will give nebs, steroids, check chest x-ray, viral swab, reassess.    Viral swab negative.  Chest x-ray clear.  Patient improved after nebs and steroids, wants to go home.  Will give PO azithro.  Will refer to pulm as outpatient. 50-year-old male history of cigar smoking presents with cough productive of green sputum for 1 month.  On exam, patient is afebrile, vital signs are stable.  Patient well-appearing in no acute distress.  Patient is satting well on room air.  Patient has mild diffuse expiratory wheezing.  Suspect possible bronchitis versus COPD versus viral syndrome.  Will give nebs, steroids, check chest x-ray, viral swab, reassess.    Viral swab negative.  Chest x-ray clear.  Patient improved after nebs and steroids, wants to go home.  Lungs CTAB.  Will give PO azithro.  Pt states augmentin works better for him, will send this rx as well.  Will refer to pulm as outpatient.    Pt given toradol for chronic sciatica pain per his request.  Will refer to PM&R as outpatient.

## 2025-02-01 NOTE — ED PROVIDER NOTE - CARE PROVIDER_API CALL
Amna Douglass  Pulmonary Disease  [address] [place] [id number]  Phone: [phone]  Fax: [phone]  Follow Up Time: 1-3 Days   Amna Douglass  Pulmonary Disease  [address] [place] [id number]  Phone: [phone]  Fax: [phone]  Follow Up Time: 1-3 Days    Castro Orozco  Physical/Rehab Medicine  [address] [place] [address], [address]  [place] [address]  Phone: [phone]  Fax: [phone]  Follow Up Time:

## 2025-02-01 NOTE — ED PROVIDER NOTE - OBJECTIVE STATEMENT
50-year-old male with history of sciatica presents with cough productive of green sputum for 1 month.  Patient states he had similar cough approximately 6 months ago, which resolved after taking antibiotics (azithromycin rx'ed here in 08/24), and feels like he is having the same problem.  Family at bedside states that they have heard what sounds like wheezing from him.  No history of asthma.  Smokes cigars, does not smoke cigarettes.  No leg swelling.  No hemoptysis.  No pleurisy.  No chest pain.  No fever or chills.  No rhinorrhea.

## 2025-02-01 NOTE — ED PROVIDER NOTE - PATIENT PORTAL LINK FT
You can access the FollowMyHealth Patient Portal offered by [facility] by registering at the following website: [email or web address]. By joining [facility]’s FollowMyHealth portal, you will also be able to view your health information using other applications (apps) compatible with our system.

## 2025-02-01 NOTE — ED PROVIDER NOTE - PHYSICAL EXAMINATION
Constitutional: awake and alert, in no acute distress  HEENT: head normocephalic and atraumatic. moist mucous membranes  Eyes: extraocular movements intact, normal conjunctiva  Neck: supple, normal ROM  Cardiovascular: regular rate   Pulmonary: no respiratory distress, mild diffuse exp wheezing, most significant in LLL  Gastrointestinal: abdomen flat and nondistended  Skin: warm, dry, normal for ethnicity  Musculoskeletal: no edema, no deformity  Neurological: oriented x4, no focal neurologic deficit.   Psychiatric: calm and cooperative

## 2025-02-01 NOTE — ED PROVIDER NOTE - PROVIDER TOKENS
PROVIDER:[TOKEN:[[id number]],FOLLOWUP:[1-3 Days]] PROVIDER:[TOKEN:[[id number]],FOLLOWUP:[1-3 Days]],PROVIDER:[TOKEN:[[id number]]]